# Patient Record
Sex: FEMALE | Race: WHITE | HISPANIC OR LATINO | URBAN - METROPOLITAN AREA
[De-identification: names, ages, dates, MRNs, and addresses within clinical notes are randomized per-mention and may not be internally consistent; named-entity substitution may affect disease eponyms.]

---

## 2024-07-05 ENCOUNTER — APPOINTMENT (OUTPATIENT)
Dept: GENERAL RADIOLOGY | Facility: HOSPITAL | Age: 24
End: 2024-07-05
Payer: COMMERCIAL

## 2024-07-05 ENCOUNTER — HOSPITAL ENCOUNTER (EMERGENCY)
Facility: HOSPITAL | Age: 24
Discharge: HOME | End: 2024-07-05
Attending: EMERGENCY MEDICINE
Payer: COMMERCIAL

## 2024-07-05 VITALS
BODY MASS INDEX: 39.65 KG/M2 | SYSTOLIC BLOOD PRESSURE: 142 MMHG | TEMPERATURE: 98.1 F | RESPIRATION RATE: 18 BRPM | OXYGEN SATURATION: 98 % | HEART RATE: 95 BPM | WEIGHT: 238 LBS | HEIGHT: 65 IN | DIASTOLIC BLOOD PRESSURE: 80 MMHG

## 2024-07-05 DIAGNOSIS — S93.402A SPRAIN OF LEFT ANKLE, UNSPECIFIED LIGAMENT, INITIAL ENCOUNTER: Primary | ICD-10-CM

## 2024-07-05 PROCEDURE — 99283 EMERGENCY DEPT VISIT LOW MDM: CPT

## 2024-07-05 PROCEDURE — 99283 EMERGENCY DEPT VISIT LOW MDM: CPT | Performed by: EMERGENCY MEDICINE

## 2024-07-05 PROCEDURE — 73610 X-RAY EXAM OF ANKLE: CPT

## 2024-07-05 RX ORDER — NAPROXEN 375 MG/1
375 TABLET ORAL 2 TIMES DAILY WITH MEALS
Qty: 20 TABLET | Refills: 0 | Status: SHIPPED | OUTPATIENT
Start: 2024-07-05 | End: 2024-07-15

## 2024-07-05 RX ORDER — HYDROCODONE BITARTRATE AND ACETAMINOPHEN 5; 325 MG/1; MG/1
1 TABLET ORAL ONCE AS NEEDED
Status: DISCONTINUED | OUTPATIENT
Start: 2024-07-05 | End: 2024-07-06 | Stop reason: HOSPADM

## 2024-07-05 RX ORDER — IBUPROFEN 400 MG/1
800 TABLET ORAL ONCE
Status: COMPLETED | OUTPATIENT
Start: 2024-07-05 | End: 2024-07-05

## 2024-07-05 RX ADMIN — IBUPROFEN 800 MG: 400 TABLET, FILM COATED ORAL at 22:46

## 2024-07-05 NOTE — Clinical Note
[facility] FSED [place] [id number] [place] [address] [place] [address]  Phone: [phone]    LYRIC Ca was seen and treated in our emergency department on 7/5/2024.  She may return to work on 07/08/2024.         Thank you for choosing [facility].    Guilherme Bryson MD

## 2024-07-06 NOTE — FSED PROVIDER NOTE
Subjective   History of Present Illness  24-year-old female accompanied by her mother reports rolling her left ankle at about 9:15 tonight.  She was on a merry-go-round that had handles and went to jump off and her left ankle rolled about 90 degrees and she heard a pop.  She states the entire ankle hurts at her sock line.  She has been icing and now it is swelling.  She reports her pain is 8-9/10.  No medications taken.  Last menstrual period was a month ago.  She denies being pregnant.  Mom states that she was crying she was having so much pain. She has been fine otherwise. No nausea, vomiting, fever, chills, or cough. No other injury.        Review of Systems    History reviewed. No pertinent past medical history.    No Known Allergies    Past Surgical History:   Procedure Laterality Date    EYE SURGERY      WISDOM TOOTH EXTRACTION         History reviewed. No pertinent family history.    Social History     Socioeconomic History    Marital status: Single   Tobacco Use    Smoking status: Never   Substance and Sexual Activity    Alcohol use: Yes     Comment: socially    Drug use: Never           Objective   Physical Exam  Vitals and nursing note reviewed.   Constitutional:       Appearance: Normal appearance. She is obese.      Comments: Seated in a wheelchair   HENT:      Head: Normocephalic and atraumatic.      Nose: Nose normal.   Eyes:      Extraocular Movements: Extraocular movements intact.   Cardiovascular:      Rate and Rhythm: Normal rate and regular rhythm.      Pulses: Normal pulses.      Heart sounds: Normal heart sounds.   Pulmonary:      Effort: Pulmonary effort is normal.      Breath sounds: Normal breath sounds.   Abdominal:      Comments: obese   Musculoskeletal:         General: Normal range of motion.      Cervical back: Normal range of motion.      Comments: Moderate swelling left lateral malleolus, very tender distal tip of left fibula, no ecchymosis, skin intact   Skin:     General: Skin is  warm and dry.   Neurological:      General: No focal deficit present.      Mental Status: She is alert and oriented to person, place, and time.   Psychiatric:         Mood and Affect: Mood normal.         Behavior: Behavior normal.         Procedures           ED Course  ED Course as of 07/06/24 0010 Fri Jul 05, 2024   2241 Left ankle xrays read as:  IMPRESSION:  Impression:  1.Considerable soft tissue swelling along the lateral malleolar area.  2.Questionable widening of the ankle mortise medially  3.Small ankle joint effusion.   [CS]      ED Course User Index  [CS] Guilherme Bryson MD                                           Medical Decision Making  Patient rolled her ankle and appears to have a left lateral ankle sprain.  I did not see any obvious fracture upon my review of the x-rays either.  There was none noted by the radiologist.  The patient was placed in Ace wrap for direct compression and then in a boot.  Mom had acted like she wanted her to have a stronger pain medication so I ordered a dose of Norco but this did not need to be given.  She was given a dose of Motrin.  She was icing prior to arrival and it had been less than an hour and did not need ice here.  She is able to walk in the boot without crutches.  I gave her an orthopedic referral and advised her to call Monday to arrange close follow-up.  She says she is going to [place] for a week on Tuesday.  I still told her to call Ortho so she has an appointment when she gets back and they can reassess her and set her up for physical therapy.  Prescription for Naprosyn for home.  She can take Tylenol with this as needed.  Return if worse or concerns.    Problems Addressed:  Sprain of left ankle, unspecified ligament, initial encounter: complicated acute illness or injury    Amount and/or Complexity of Data Reviewed  Radiology: ordered.    Risk  Prescription drug management.        Final diagnoses:   Sprain of left ankle, unspecified ligament, initial  encounter       ED Disposition  ED Disposition       ED Disposition   Discharge    Condition   Stable    Comment   --               Provider, No Known  [facility]  [place] [address]    Call in 3 days      Mark Ross MD  [address] [place] [address] [phone]    Call in 3 days           Medication List        New Prescriptions      naproxen 375 MG tablet  Commonly known as: NAPROSYN  Take 1 tablet by mouth 2 (Two) Times a Day With Meals for 20 doses.               Where to Get Your Medications        These medications were sent to [facility] [id number] - [place], [place] - [address] [place] [address] [address]  - [phone]  - [phone] [address] [place] [address]      Phone: [phone]   naproxen 375 MG tablet

## 2024-07-06 NOTE — DISCHARGE INSTRUCTIONS
Ice 20 minutes on an hour off.  Take anti-inflammatories and Tylenol as needed for pain.  Call orthopedics Monday morning to set up close follow-up.  It also benefit from getting physical therapy to rehabilitate your ankle.  Elevate leg at rest.  Return if worse or concerns.

## 2024-07-07 ENCOUNTER — NURSE TRIAGE (OUTPATIENT)
Dept: CALL CENTER | Facility: HOSPITAL | Age: 24
End: 2024-07-07
Payer: COMMERCIAL

## 2024-07-07 NOTE — TELEPHONE ENCOUNTER
"Caller was seen in the ED and put in a boot for ankle sprain she is to call ortho for an appt in the am.  Asking how long she is to wear the boot.  Epic records reviewed.  No mention of how long to wear the boot.  Instructed her to ask tomorrow when she calls the ortho office.  She is also needing a return to work statement.  Warm transfer to [facility].  Reason for Disposition  • [1] Caller requesting NON-URGENT health information AND [2] PCP's office is the best resource    Additional Information  • Negative: [1] Caller is not with the adult (patient) AND [2] reporting urgent symptoms  • Negative: Lab result questions  • Negative: Medication questions  • Negative: Caller can't be reached by phone  • Negative: Caller has already spoken to PCP or another triager  • Negative: RN needs further essential information from caller in order to complete triage  • Negative: Requesting regular office appointment    Answer Assessment - Initial Assessment Questions  1. REASON FOR CALL or QUESTION: \"What is your reason for calling today?\" or \"How can I best help you?\" or \"What question do you have that I can help answer?\"      How long am I supposed to wear this boot?    Protocols used: Information Only Call - No Triage-ADULT-    "